# Patient Record
Sex: MALE | Race: WHITE | NOT HISPANIC OR LATINO | URBAN - METROPOLITAN AREA
[De-identification: names, ages, dates, MRNs, and addresses within clinical notes are randomized per-mention and may not be internally consistent; named-entity substitution may affect disease eponyms.]

---

## 2022-12-23 ENCOUNTER — EMERGENCY (EMERGENCY)
Facility: HOSPITAL | Age: 27
LOS: 1 days | Discharge: HOME | End: 2022-12-23
Attending: STUDENT IN AN ORGANIZED HEALTH CARE EDUCATION/TRAINING PROGRAM | Admitting: STUDENT IN AN ORGANIZED HEALTH CARE EDUCATION/TRAINING PROGRAM
Payer: SELF-PAY

## 2022-12-23 VITALS
RESPIRATION RATE: 18 BRPM | HEART RATE: 122 BPM | SYSTOLIC BLOOD PRESSURE: 122 MMHG | TEMPERATURE: 98 F | DIASTOLIC BLOOD PRESSURE: 82 MMHG | OXYGEN SATURATION: 96 % | WEIGHT: 235.01 LBS | HEIGHT: 65 IN

## 2022-12-23 VITALS
HEART RATE: 111 BPM | OXYGEN SATURATION: 97 % | DIASTOLIC BLOOD PRESSURE: 64 MMHG | TEMPERATURE: 98 F | SYSTOLIC BLOOD PRESSURE: 119 MMHG | RESPIRATION RATE: 18 BRPM

## 2022-12-23 LAB
ALBUMIN SERPL ELPH-MCNC: 4.7 G/DL — SIGNIFICANT CHANGE UP (ref 3.3–5)
ALP SERPL-CCNC: 48 U/L — SIGNIFICANT CHANGE UP (ref 40–120)
ALT FLD-CCNC: 25 U/L — SIGNIFICANT CHANGE UP (ref 10–45)
ANION GAP SERPL CALC-SCNC: 15 MMOL/L — SIGNIFICANT CHANGE UP (ref 5–17)
AST SERPL-CCNC: 20 U/L — SIGNIFICANT CHANGE UP (ref 10–40)
BASOPHILS # BLD AUTO: 0.05 K/UL — SIGNIFICANT CHANGE UP (ref 0–0.2)
BASOPHILS NFR BLD AUTO: 0.5 % — SIGNIFICANT CHANGE UP (ref 0–2)
BILIRUB SERPL-MCNC: 0.3 MG/DL — SIGNIFICANT CHANGE UP (ref 0.2–1.2)
BUN SERPL-MCNC: 17 MG/DL — SIGNIFICANT CHANGE UP (ref 7–23)
CALCIUM SERPL-MCNC: 9.3 MG/DL — SIGNIFICANT CHANGE UP (ref 8.4–10.5)
CHLORIDE SERPL-SCNC: 101 MMOL/L — SIGNIFICANT CHANGE UP (ref 96–108)
CO2 SERPL-SCNC: 24 MMOL/L — SIGNIFICANT CHANGE UP (ref 22–31)
CREAT SERPL-MCNC: 0.74 MG/DL — SIGNIFICANT CHANGE UP (ref 0.5–1.3)
EGFR: 127 ML/MIN/1.73M2 — SIGNIFICANT CHANGE UP
EOSINOPHIL # BLD AUTO: 0.3 K/UL — SIGNIFICANT CHANGE UP (ref 0–0.5)
EOSINOPHIL NFR BLD AUTO: 2.9 % — SIGNIFICANT CHANGE UP (ref 0–6)
GLUCOSE SERPL-MCNC: 166 MG/DL — HIGH (ref 70–99)
HCT VFR BLD CALC: 44.8 % — SIGNIFICANT CHANGE UP (ref 39–50)
HGB BLD-MCNC: 14.9 G/DL — SIGNIFICANT CHANGE UP (ref 13–17)
IMM GRANULOCYTES NFR BLD AUTO: 0.6 % — SIGNIFICANT CHANGE UP (ref 0–0.9)
LYMPHOCYTES # BLD AUTO: 3.29 K/UL — SIGNIFICANT CHANGE UP (ref 1–3.3)
LYMPHOCYTES # BLD AUTO: 31.5 % — SIGNIFICANT CHANGE UP (ref 13–44)
MCHC RBC-ENTMCNC: 29.9 PG — SIGNIFICANT CHANGE UP (ref 27–34)
MCHC RBC-ENTMCNC: 33.3 GM/DL — SIGNIFICANT CHANGE UP (ref 32–36)
MCV RBC AUTO: 90 FL — SIGNIFICANT CHANGE UP (ref 80–100)
MONOCYTES # BLD AUTO: 0.7 K/UL — SIGNIFICANT CHANGE UP (ref 0–0.9)
MONOCYTES NFR BLD AUTO: 6.7 % — SIGNIFICANT CHANGE UP (ref 2–14)
NEUTROPHILS # BLD AUTO: 6.05 K/UL — SIGNIFICANT CHANGE UP (ref 1.8–7.4)
NEUTROPHILS NFR BLD AUTO: 57.8 % — SIGNIFICANT CHANGE UP (ref 43–77)
NRBC # BLD: 0 /100 WBCS — SIGNIFICANT CHANGE UP (ref 0–0)
PLATELET # BLD AUTO: 271 K/UL — SIGNIFICANT CHANGE UP (ref 150–400)
POTASSIUM SERPL-MCNC: 3.6 MMOL/L — SIGNIFICANT CHANGE UP (ref 3.5–5.3)
POTASSIUM SERPL-SCNC: 3.6 MMOL/L — SIGNIFICANT CHANGE UP (ref 3.5–5.3)
PROT SERPL-MCNC: 7.7 G/DL — SIGNIFICANT CHANGE UP (ref 6–8.3)
RBC # BLD: 4.98 M/UL — SIGNIFICANT CHANGE UP (ref 4.2–5.8)
RBC # FLD: 13.1 % — SIGNIFICANT CHANGE UP (ref 10.3–14.5)
SARS-COV-2 RNA SPEC QL NAA+PROBE: NEGATIVE — SIGNIFICANT CHANGE UP
SODIUM SERPL-SCNC: 140 MMOL/L — SIGNIFICANT CHANGE UP (ref 135–145)
WBC # BLD: 10.45 K/UL — SIGNIFICANT CHANGE UP (ref 3.8–10.5)
WBC # FLD AUTO: 10.45 K/UL — SIGNIFICANT CHANGE UP (ref 3.8–10.5)

## 2022-12-23 PROCEDURE — 93010 ELECTROCARDIOGRAM REPORT: CPT

## 2022-12-23 PROCEDURE — 99284 EMERGENCY DEPT VISIT MOD MDM: CPT

## 2022-12-23 PROCEDURE — 85025 COMPLETE CBC W/AUTO DIFF WBC: CPT

## 2022-12-23 PROCEDURE — 99283 EMERGENCY DEPT VISIT LOW MDM: CPT

## 2022-12-23 PROCEDURE — 82962 GLUCOSE BLOOD TEST: CPT

## 2022-12-23 PROCEDURE — 87635 SARS-COV-2 COVID-19 AMP PRB: CPT

## 2022-12-23 PROCEDURE — 93005 ELECTROCARDIOGRAM TRACING: CPT

## 2022-12-23 PROCEDURE — 80053 COMPREHEN METABOLIC PANEL: CPT

## 2022-12-23 PROCEDURE — 36415 COLL VENOUS BLD VENIPUNCTURE: CPT

## 2022-12-23 RX ORDER — SODIUM CHLORIDE 9 MG/ML
1000 INJECTION, SOLUTION INTRAVENOUS ONCE
Refills: 0 | Status: COMPLETED | OUTPATIENT
Start: 2022-12-23 | End: 2022-12-23

## 2022-12-23 RX ORDER — ALPRAZOLAM 0.25 MG
0.25 TABLET ORAL ONCE
Refills: 0 | Status: DISCONTINUED | OUTPATIENT
Start: 2022-12-23 | End: 2022-12-23

## 2022-12-23 RX ADMIN — Medication 0.25 MILLIGRAM(S): at 18:27

## 2022-12-23 RX ADMIN — SODIUM CHLORIDE 1000 MILLILITER(S): 9 INJECTION, SOLUTION INTRAVENOUS at 17:17

## 2022-12-23 NOTE — ED PROVIDER NOTE - OBJECTIVE STATEMENT
27yM w/ hx panic attacks here for panic attack.  Says he had a few panic attacks after his mom passed, and now his dad is hospitalized at [facility] and that's the only family he has. He was at work today and thinking about his dad and felt like he couldn't breathe, was having palpitations and felt like he was choking. Syncopized. Pt says he still feels anxious, is worried about his dad, is going to visit him to spend the holidays w/ his dad. Doesn't have anyone he confides in. Is open to referral to psychiatry.

## 2022-12-23 NOTE — ED PROVIDER NOTE - PATIENT PORTAL LINK FT
You can access the FollowMyHealth Patient Portal offered by [facility] by registering at the following website: [email or web address]. By joining [employer]’s FollowMyHealth portal, you will also be able to view your health information using other applications (apps) compatible with our system.

## 2022-12-23 NOTE — ED ADULT NURSE NOTE - NSIMPLEMENTINTERV_GEN_ALL_ED
Implemented All Universal Safety Interventions:  [place] to call system. Call bell, personal items and telephone within reach. Instruct patient to call for assistance. Room bathroom lighting operational. Non-slip footwear when patient is off stretcher. Physically safe environment: no spills, clutter or unnecessary equipment. Stretcher in lowest position, wheels locked, appropriate side rails in place.

## 2022-12-23 NOTE — ED PROVIDER NOTE - NSFOLLOWUPINSTRUCTIONS_ED_ALL_ED_FT
Please reach out to Katelyn Valdez ([facility] clinical referral coordinator) to assist you with your follow-up appointment.     Monday - Friday 11am-7pm  [phone]  [email or web address]  _____________    Panic Attack      A panic attack is when you suddenly feel very afraid, uncomfortable, or nervous (anxious). A panic attack can happen when you are scared, or it may happen for no reason.    A panic attack can feel like a heart attack or stroke. See your doctor when you have a panic attack to make sure you are not having a heart attack or stroke.      What are the causes?    •Experiencing things that threaten your life, such as a war.      •Feeling worried or nervous for a long time (anxiety disorder).      •Being sad (depressed).      •Panic disorder.      •Certain medical conditions.      Other causes may include:  •Certain medicines.      •Taking certain supplements.      •Illegal drugs.        What increases the risk?    •Having another mental health condition.      •Using alcohol or drugs.      •Being under a lot of stress.      •Having events in your life that cause worry and sadness.        What are the signs or symptoms?    A panic attack:  •Starts suddenly.      •May last 5–10 minutes.      Symptoms include one or more of these:  •A pounding heart.      •A feeling that your heart is beating in an unusual way or faster than normal (palpitations).      •Sweating or shaking.      •Feeling short of breath.      •Chest pain.      •Feeling like you may vomit (nauseous).      •Feeling dizzy or like you might faint.      Other symptoms may include:  •Chills or hot flashes.      •Numbness or tingling in your lips, hands, or feet.      •Feeling confused.      •Fear of losing control.      •Fear of dying.        How is this treated?    A panic attack is a symptom of another condition. Treatment depends on the cause of the panic attack.  •If the cause is a medical problem, your doctor will treat that problem or refer you to a specialist.      •If the cause is emotional, you may be given medicines or referred to a counselor.      •If the cause is a medicine, your doctor may tell you to stop the medicine, change your dose, or take a different medicine.    •If the cause is an illegal drug, treatment may involve letting the drug wear off and taking medicine to help the drug leave your body or to stop its effects.  •Attacks caused by heavy drug use may continue even if you stop using the drug.        Most panic attacks go away after the cause is treated.      Follow these instructions at home:    Alcohol use   • Do not drink alcohol if:  •Your doctor tells you not to drink.      •You are pregnant, may be pregnant, or are planning to become pregnant.      •If you drink alcohol:•Limit how much you have to:  •0–1 drink a day for women.      •0–2 drinks a day for men.          •Know how much alcohol is in your drink. In the [place] one drink equals one 12 oz bottle of beer (355 mL), one 5 oz glass of wine (148 mL), or one 1½ oz glass of hard liquor (44 mL).        General instructions   Doctor speaking with a patient in the doctor's office.   •Take over-the-counter and prescription medicines only as told by your doctor.      •If you feel worried or nervous, try not to have caffeine.    •Take good care of your health. To do this:  •Eat healthy. Make sure to eat fresh fruits and vegetables, whole grains, lean meats, and low-fat dairy.      •Get enough sleep. Try to sleep for 7–8 hours each night.      •Exercise. Try to be active for 30 minutes 5 or more days a week.        • Do not smoke or use any products that contain nicotine or tobacco. If you need help quitting, ask your doctor.      •Keep all follow-up visits.        Where to find more information    •Substance Abuse and Mental Health Services Administration (SAMHSA): samhsa.gov      •National [place] of Mental Health (NIMH): www.nimh.nih.gov        Contact a doctor if:    •Your symptoms do not get better.      •Your symptoms get worse.      •You are not able to take your medicines as told.        Get help right away if:    •You have thoughts of hurting yourself or others.      Get help right away if you feel like you may hurt yourself or others, or have thoughts about taking your own life. Go to your nearest emergency room or:   • Call 911.       • Call the National Suicide Prevention Lifeline at 1-230.623.8141 or 929. This is open 24 hours a day.      • Text the Crisis Text Line at 437966.         Summary    •A panic attack is when you suddenly feel very afraid, uncomfortable, or nervous (anxious).      •See your doctor when you have a panic attack to make sure that you do not have another serious problem.      •If you feel like you may hurt yourself or others, get help right away. Call 911.      This information is not intended to replace advice given to you by your health care provider. Make sure you discuss any questions you have with your health care provider.      Document Revised: 07/28/2022 Document Reviewed: 07/28/2022    Elsevier Patient Education © 2022 [employer]

## 2022-12-23 NOTE — ED PROVIDER NOTE - CLINICAL SUMMARY MEDICAL DECISION MAKING FREE TEXT BOX
Panic attack 2/2 his dad being sick and hospitalized at this time  EKG sinus tachycardia, labs wnl, BGM ok  pt denies supplements/caffeine etc  plan for small dose xanax, dc w/ outpt psych referral

## 2022-12-23 NOTE — ED PROVIDER NOTE - PHYSICAL EXAMINATION
CONST: nontoxic NAD speaking in full sentences anxious appearing shaking his legs  HEAD: atraumatic  EYES: conjunctivae clear, PERRL, EOMI  ENT: mmm  NECK: supple/FROM, nttp, no jvd  CARD: mild tachycardia  CHEST: ctab no r/r/w, no stridor/retractions/tripoding  ABD: soft, nd, nttp, no rebound/guarding  EXT: FROM, symmetric distal pulses intact  SKIN: warm, dry, no rash, no pedal edema/ttp/rash, cap refill <2sec  NEURO: a+ox3, 5/5 strength x4, gross sensation intact x4, baseline gait

## 2022-12-23 NOTE — ED ADULT TRIAGE NOTE - CHIEF COMPLAINT QUOTE
Pt BIBEMS from work due to patient feeling "light headed and out of it." Pt states he was eating and sandwich and after felt very strange "Like I couldn't swallow even though I could." Pt denies any CP or SOB, pt had brief period of unresponsiveness during triage lasting ~ 5 seconds. pt tachycardic, denies ETOH or drug use.

## 2022-12-27 DIAGNOSIS — Z20.822 CONTACT WITH AND (SUSPECTED) EXPOSURE TO COVID-19: ICD-10-CM

## 2022-12-27 DIAGNOSIS — R00.0 TACHYCARDIA, UNSPECIFIED: ICD-10-CM

## 2022-12-27 DIAGNOSIS — R42 DIZZINESS AND GIDDINESS: ICD-10-CM

## 2022-12-27 DIAGNOSIS — F41.0 PANIC DISORDER [EPISODIC PAROXYSMAL ANXIETY]: ICD-10-CM
